# Patient Record
Sex: MALE | Race: WHITE | Employment: FULL TIME | URBAN - METROPOLITAN AREA
[De-identification: names, ages, dates, MRNs, and addresses within clinical notes are randomized per-mention and may not be internally consistent; named-entity substitution may affect disease eponyms.]

---

## 2019-12-18 ENCOUNTER — HOSPITAL ENCOUNTER (EMERGENCY)
Facility: CLINIC | Age: 44
Discharge: HOME | End: 2019-12-18
Attending: PHYSICIAN ASSISTANT | Admitting: PHYSICIAN ASSISTANT
Payer: COMMERCIAL

## 2019-12-18 ENCOUNTER — APPOINTMENT (OUTPATIENT)
Dept: GENERAL RADIOLOGY | Facility: CLINIC | Age: 44
End: 2019-12-18
Attending: PHYSICIAN ASSISTANT
Payer: COMMERCIAL

## 2019-12-18 VITALS
RESPIRATION RATE: 16 BRPM | HEIGHT: 73 IN | DIASTOLIC BLOOD PRESSURE: 88 MMHG | WEIGHT: 196 LBS | HEART RATE: 64 BPM | BODY MASS INDEX: 25.98 KG/M2 | TEMPERATURE: 97.3 F | SYSTOLIC BLOOD PRESSURE: 145 MMHG | OXYGEN SATURATION: 97 %

## 2019-12-18 DIAGNOSIS — R07.9 CHEST PAIN, UNSPECIFIED TYPE: ICD-10-CM

## 2019-12-18 LAB
ANION GAP SERPL CALCULATED.3IONS-SCNC: 2 MMOL/L (ref 3–14)
BASOPHILS # BLD AUTO: 0.1 10E9/L (ref 0–0.2)
BASOPHILS NFR BLD AUTO: 0.6 %
BUN SERPL-MCNC: 13 MG/DL (ref 7–30)
CALCIUM SERPL-MCNC: 9.7 MG/DL (ref 8.5–10.1)
CHLORIDE SERPL-SCNC: 103 MMOL/L (ref 94–109)
CO2 SERPL-SCNC: 30 MMOL/L (ref 20–32)
CREAT SERPL-MCNC: 1.08 MG/DL (ref 0.66–1.25)
DIFFERENTIAL METHOD BLD: ABNORMAL
EOSINOPHIL # BLD AUTO: 0.2 10E9/L (ref 0–0.7)
EOSINOPHIL NFR BLD AUTO: 1.9 %
ERYTHROCYTE [DISTWIDTH] IN BLOOD BY AUTOMATED COUNT: 12.9 % (ref 10–15)
GFR SERPL CREATININE-BSD FRML MDRD: 81 ML/MIN/{1.73_M2}
GLUCOSE SERPL-MCNC: 85 MG/DL (ref 70–99)
HCT VFR BLD AUTO: 55.3 % (ref 40–53)
HGB BLD-MCNC: 19.6 G/DL (ref 13.3–17.7)
IMM GRANULOCYTES # BLD: 0 10E9/L (ref 0–0.4)
IMM GRANULOCYTES NFR BLD: 0.1 %
INTERPRETATION ECG - MUSE: NORMAL
LYMPHOCYTES # BLD AUTO: 2.3 10E9/L (ref 0.8–5.3)
LYMPHOCYTES NFR BLD AUTO: 30 %
MCH RBC QN AUTO: 33.7 PG (ref 26.5–33)
MCHC RBC AUTO-ENTMCNC: 35.4 G/DL (ref 31.5–36.5)
MCV RBC AUTO: 95 FL (ref 78–100)
MONOCYTES # BLD AUTO: 1 10E9/L (ref 0–1.3)
MONOCYTES NFR BLD AUTO: 13.1 %
NEUTROPHILS # BLD AUTO: 4.2 10E9/L (ref 1.6–8.3)
NEUTROPHILS NFR BLD AUTO: 54.3 %
NRBC # BLD AUTO: 0 10*3/UL
NRBC BLD AUTO-RTO: 0 /100
PLATELET # BLD AUTO: 213 10E9/L (ref 150–450)
POTASSIUM SERPL-SCNC: 4 MMOL/L (ref 3.4–5.3)
RBC # BLD AUTO: 5.82 10E12/L (ref 4.4–5.9)
SODIUM SERPL-SCNC: 135 MMOL/L (ref 133–144)
TROPONIN I SERPL-MCNC: <0.015 UG/L (ref 0–0.04)
TROPONIN I SERPL-MCNC: <0.015 UG/L (ref 0–0.04)
WBC # BLD AUTO: 7.8 10E9/L (ref 4–11)

## 2019-12-18 PROCEDURE — 80048 BASIC METABOLIC PNL TOTAL CA: CPT | Performed by: PHYSICIAN ASSISTANT

## 2019-12-18 PROCEDURE — 85025 COMPLETE CBC W/AUTO DIFF WBC: CPT | Performed by: PHYSICIAN ASSISTANT

## 2019-12-18 PROCEDURE — 71046 X-RAY EXAM CHEST 2 VIEWS: CPT

## 2019-12-18 PROCEDURE — 99285 EMERGENCY DEPT VISIT HI MDM: CPT

## 2019-12-18 PROCEDURE — 84484 ASSAY OF TROPONIN QUANT: CPT | Performed by: PHYSICIAN ASSISTANT

## 2019-12-18 PROCEDURE — 93005 ELECTROCARDIOGRAM TRACING: CPT

## 2019-12-18 PROCEDURE — 96374 THER/PROPH/DIAG INJ IV PUSH: CPT

## 2019-12-18 PROCEDURE — 25000128 H RX IP 250 OP 636: Performed by: PHYSICIAN ASSISTANT

## 2019-12-18 RX ORDER — KETOROLAC TROMETHAMINE 15 MG/ML
15 INJECTION, SOLUTION INTRAMUSCULAR; INTRAVENOUS ONCE
Status: COMPLETED | OUTPATIENT
Start: 2019-12-18 | End: 2019-12-18

## 2019-12-18 RX ADMIN — KETOROLAC TROMETHAMINE 15 MG: 15 INJECTION, SOLUTION INTRAMUSCULAR; INTRAVENOUS at 13:59

## 2019-12-18 ASSESSMENT — ENCOUNTER SYMPTOMS
NAUSEA: 0
ABDOMINAL PAIN: 0
SHORTNESS OF BREATH: 0

## 2019-12-18 ASSESSMENT — MIFFLIN-ST. JEOR: SCORE: 1826.1

## 2019-12-18 NOTE — ED AVS SNAPSHOT
Emergency Department  [address] [facility] [address]  Phone:  [phone]  Fax:  [phone]                                    Richard Yang   MRN: [id number]    Department:   Emergency Department   Date of Visit:  12/18/2019           After Visit Summary Signature Page    I have received my discharge instructions, and my questions have been answered. I have discussed any challenges I see with this plan with the nurse or doctor.    ..........................................................................................................................................  Patient/Patient Representative Signature      ..........................................................................................................................................  Patient Representative Print Name and Relationship to Patient    ..................................................               ................................................  Date                                   Time    ..........................................................................................................................................  Reviewed by Signature/Title    ...................................................              ..............................................  Date                                               Time          22EPIC Rev 08/18

## 2019-12-18 NOTE — ED TRIAGE NOTES
"Pt states that he developed sharp, left sided chest pain starting approx. 45 minutes PTA. Pt states that he was bending over the nail something in and developed the pain. \"A little short of breath\".  "

## 2019-12-18 NOTE — DISCHARGE INSTRUCTIONS
*You may resume diet and activities as tolerated.  *No new medications. Continue your current medications.    *Follow-up with your doctor for a recheck in 2-3 days.    *Return if you develop difficulty in breathing, faint or feel like you will faint or become worse in any way.     Discharge Instructions  Chest Pain    You have been seen today for chest pain or discomfort.  At this time, your provider has found no signs that your chest pain is due to a serious or life-threatening condition, (or you have declined more testing and/or admission to the hospital). However, sometimes there is a serious problem that does not show up right away. Your evaluation today may not be complete and you may need further testing and evaluation.     Generally, every Emergency Department visit should have a follow-up clinic visit with either a primary or a specialty clinic/provider. Please follow-up as instructed by your emergency provider today.  Return to the Emergency Department if:  Your chest pain changes, gets worse, starts to happen more often, or comes with less activity.  You are newly short of breath.  You get very weak or tired.  You pass out or faint.  You have any new symptoms, like fever, cough, numb legs, or you cough up blood.  You have anything else that worries you.    Until you follow-up with your regular provider, please do the following:  Take one aspirin daily unless you have an allergy or are told not to by your provider.  If a stress test appointment has been made, go to the appointment.  If you have questions, contact your regular provider.  Follow-up with your regular provider/clinic as directed; this is very important.    If you were given a prescription for medicine here today, be sure to read all of the information (including the package insert) that comes with your prescription.  This will include important information about the medicine, its side effects, and any warnings that you need to know about.  The  pharmacist who fills the prescription can provide more information and answer questions you may have about the medicine.  If you have questions or concerns that the pharmacist cannot address, please call or return to the Emergency Department.       Remember that you can always come back to the Emergency Department if you are not able to see your regular provider in the amount of time listed above, if you get any new symptoms, or if there is anything that worries you.

## 2019-12-18 NOTE — ED PROVIDER NOTES
"  History     Chief Complaint:  Chest Pain    HPI   Richard Yang is a 44 year old male who presents with chest pain. Patient states that the chest pain began at 1000 today when he bent over to nail two blocks together. The pain is sharp and stabbing and eventually radiated to his back. He reports he has never had this kind of pain before. He states that it has continued throughout the morning but is now subsiding in the ED. Shortly after the initial pain, the patient also noticed right shoulder pain and mild right hand numbness that was associated with difficulty raising his right arm. The right shoulder pain and numbness have since resolved. Patient denies abdominal pain, nausea, and shortness of breath. He does not take any daily medications.    CARDIAC RISK FACTORS:  Sex:    male  Tobacco:   Former user  Hypertension:   no  Hyperlipidemia:  no  Diabetes:   no  Family History:  Father quadruple bypass in his late 60s    Allergies:  Amoxicillin  Penicillin    Medications:    No current outpatient medications on file.     Past Medical History:    Seasonal allergies  Eczema  Depressive disorder    Past Surgical History:    Tendon repair on right middle finger    Family History:    Father:Depression, Diabetes, Sleep apnea  Mother: Depression    Social History:  Smoking Status: former user  Smokeless Tobacco: current user, chew  Alcohol Use: Yes  Drug Use: No    Review of Systems   Respiratory: Negative for shortness of breath.    Cardiovascular: Positive for chest pain.   Gastrointestinal: Negative for abdominal pain and nausea.   All other systems reviewed and are negative.    Physical Exam     Patient Vitals for the past 24 hrs:   BP Temp Temp src Pulse Resp SpO2 Height Weight   12/18/19 1135 (!) 142/103 97.3  F (36.3  C) Temporal 77 16 97 % 1.859 m (6' 1.2\") 88.9 kg (196 lb)      Physical Exam  General: Alert, interactive.   Head:  Scalp is atraumatic.  Eyes:  EOM intact. The pupils are equal, round, and " reactive to light. No scleral icterus.   ENT:                                      Ears:  The external ears are normal.  Nose:  The external nose is normal.  Throat:  The oropharynx is normal. Mucus membranes are moist.                 Neck:  Normal range of motion. There is no rigidity.   CV:  Regular rate and rhythm. No murmur. 2+ radial pulses  Resp:  Breath sounds are clear bilaterally. Non-labored, no retractions or accessory muscle use.  GI:  Abdomen is soft, no distension, no tenderness.   MS:  Normal range of motion.   Skin:  Warm and dry.   Neuro:  Strength and sensation grossly intact.   Psych:  Awake. Alert.  Appropriate interactions.     Emergency Department Course   ECG:  ECG taken at 1142, ECG read at 1145  Normal sinus rhythm  Normal ECG  Rate 69 bpm. LA interval 126 ms. QRS duration 82 ms. QT/QTc 374/400 ms. P-R-T axes 65 -12 45.    Imaging:  Radiology findings were communicated with the patient who voiced understanding of the findings.    Chest XR  Heart size is normal. No pleural effusion, pneumothorax, or abnormal area of consolidation.   Reading per radiology     Laboratory:  Laboratory findings were communicated with the patient who voiced understanding of the findings.    CBC: WBC 7.8, HGB 19.6(H),   BMP: gap anion 2(L) o/w WNL (Creatinine 1.08)  Troponin (Collected 1255): <0.015     Troponin (Collected 1501): <0.015     Interventions:  1359 Toradol 15 mg IV    Emergency Department Course:  Nursing notes and vitals reviewed.    1250 I performed an exam of the patient as documented above.     IV was inserted and blood was drawn for laboratory testing, results above.     The patient was sent for a XR Chest while in the emergency department, results above.      Repeat troponin obtained, results above.     1540 I returned to check on the patient.     Findings and plan explained to the Patient. Patient discharged home with instructions regarding supportive care, medications, and reasons to  return. The importance of close follow-up was reviewed.     Impression & Plan      HEART Score  Background  Calculates the overall risk of adverse event in patient's presenting with chest pain.  Based on 5 criteria (each assigned 0-2 points) including suspiciousness of history, EKG, age, risk factors and troponin.    Data  44 year old male  does not have a problem list on file.   reports that he has been smoking. He has been smoking about 0.25 packs per day. He has never used smokeless tobacco.  family history is not on file.  Lab Results   Component Value Date    TROPI <0.015 12/18/2019     Criteria   0-2 points for each of 5 items (maximum of 10 points):  Score 0- History slightly suspicious for coronary syndrome  Score 0- EKG Normal  Score 1- Age 45 to 65 years old  Score 1- One to 2 risk factors for atherosclerotic disease  Score 0- Within normal limits for troponin levels  Interpretation  Risk of adverse outcome  Heart Score: 2    Total Score 0-3- Adverse Outcome Risk 2.5% - Supports early discharge with appropriate follow-up      Medical Decision Making:  Richard Yang is a 44 year old male presents to the emergency department today for evaluation of chest pain. Patient reports non-exertional, non-radiating chest pain since bending down at work earlier today.  Vitals normal on arrival. Patient is well appearing and normal exam. Differential of chest pain is broad and includes aortic dissection, ACS, pulmonary embolism, pericarditis, myocarditis, pneumonia, pleural effusion, pneumothorax, musculoskeletal, GERD, anxiety, etc. Workup in the emergency department overall unremarkable. EKG shows normal sinus rhythm without acute ST changes. Initial and 2 hour delta troponin negative. Doubt ACS. HEART score 2 supports discharge home with close follow up with primary care provider. No pleuritic chest pain or hypoxia and I doubt PE. The chest xray was reviewed and shows no evidence of pneumothorax, infiltrate to  suggest pneumonia, widened mediastinum to suggest aortic dissection, or obvious rib fracture. Plan for close follow up with primary care provider and return for any worsening chest pain, shortness of breath, or any other new/concerning symptoms. Patient agrees with the plan and all questions/concerns addressed prior to discharge home.      Diagnosis:    ICD-10-CM    1. Chest pain, unspecified type R07.9      Disposition:   The patient is discharged to home.    Scribe Disclosure:  I, Marialuisa Allan, am serving as a scribe at 1:39 PM on 12/18/2019 to document services personally performed by Maria Del Rosario Abraham PA-C based on my observations and the provider's statements to me.    EMERGENCY DEPARTMENT       Maria Del Rosario Abraham PA-C  12/18/19 2010